# Patient Record
Sex: FEMALE | Race: BLACK OR AFRICAN AMERICAN | NOT HISPANIC OR LATINO | Employment: UNEMPLOYED | URBAN - METROPOLITAN AREA
[De-identification: names, ages, dates, MRNs, and addresses within clinical notes are randomized per-mention and may not be internally consistent; named-entity substitution may affect disease eponyms.]

---

## 2017-02-08 ENCOUNTER — HOSPITAL ENCOUNTER (EMERGENCY)
Facility: HOSPITAL | Age: 42
Discharge: HOME | End: 2017-02-08
Payer: MEDICAID

## 2017-02-08 VITALS
RESPIRATION RATE: 20 BRPM | TEMPERATURE: 98 F | BODY MASS INDEX: 45.99 KG/M2 | HEART RATE: 101 BPM | DIASTOLIC BLOOD PRESSURE: 71 MMHG | SYSTOLIC BLOOD PRESSURE: 158 MMHG | WEIGHT: 293 LBS | HEIGHT: 67 IN | OXYGEN SATURATION: 98 %

## 2017-02-08 DIAGNOSIS — R10.2 PELVIC PAIN DURING PREGNANCY: ICD-10-CM

## 2017-02-08 DIAGNOSIS — O26.899 PELVIC PAIN DURING PREGNANCY: ICD-10-CM

## 2017-02-08 DIAGNOSIS — Z34.90 PREGNANCY, UNSPECIFIED GESTATIONAL AGE: Primary | ICD-10-CM

## 2017-02-08 LAB
B-HCG UR QL: POSITIVE
BACTERIA #/AREA URNS HPF: NORMAL /HPF
BILIRUB UR QL STRIP: NEGATIVE
CLARITY UR: CLEAR
COLOR UR: YELLOW
GLUCOSE UR QL STRIP: NEGATIVE
HGB UR QL STRIP: ABNORMAL
HYALINE CASTS #/AREA URNS LPF: 0 /LPF
KETONES UR QL STRIP: NEGATIVE
LEUKOCYTE ESTERASE UR QL STRIP: NEGATIVE
MICROSCOPIC COMMENT: NORMAL
NITRITE UR QL STRIP: NEGATIVE
PH UR STRIP: 6 [PH] (ref 5–8)
PROT UR QL STRIP: ABNORMAL
RBC #/AREA URNS HPF: 0 /HPF (ref 0–4)
SP GR UR STRIP: 1.02 (ref 1–1.03)
SQUAMOUS #/AREA URNS HPF: 0 /HPF
URN SPEC COLLECT METH UR: ABNORMAL
UROBILINOGEN UR STRIP-ACNC: NEGATIVE EU/DL
WBC #/AREA URNS HPF: 0 /HPF (ref 0–5)

## 2017-02-08 PROCEDURE — 99283 EMERGENCY DEPT VISIT LOW MDM: CPT

## 2017-02-08 PROCEDURE — 81025 URINE PREGNANCY TEST: CPT

## 2017-02-08 PROCEDURE — 81000 URINALYSIS NONAUTO W/SCOPE: CPT

## 2017-02-08 NOTE — ED PROVIDER NOTES
SCRIBE #1 NOTE: I, Gallito Escalera, am scribing for, and in the presence of, Christopher Peña NP. I have scribed the entire note.      History      Chief Complaint   Patient presents with    pelvic pain     pt reports pelvic pain; frequent urination, odor with urine, nausea x 2 weeks        Review of patient's allergies indicates:  No Known Allergies     HPI   HPI    2/8/2017, 5:36 PM   History obtained from the patient      History of Present Illness: Natasha Seymour is a 41 y.o. female patient who presents to the Emergency Department for pelvic pain which onset gradually 2 weeks ago. Symptoms are constant and moderate in severity. Pt states the pain radiates to her back. Pt states she has had an odor coming from her urine. No mitigating or exacerbating factors reported. Associated sxs include increase in urine frequency, fatigue, and nausea. Patient denies any fever, chills, dysuria, hematuria, vomiting, diarrhea, blood in stool, vaginal pain, vaginal discharge, and all other sxs at this time. No further complaints or concerns at this time.         Arrival mode: Personal vehicle     PCP: No primary care provider on file.       Past Medical History:  Past Medical History   Diagnosis Date    Asthma     Bronchitis     Hypertension        Past Surgical History:  No past surgical history on file.      Family History:  No family history on file.    Social History:  Social History     Social History Main Topics    Smoking status: Never Smoker    Smokeless tobacco: Unknown    Alcohol use No    Drug use: No    Sexual activity: Yes     Partners: Female, Male     Birth control/ protection: None       ROS   Review of Systems   Constitutional: Positive for fatigue. Negative for chills and fever.   HENT: Negative for sore throat.    Respiratory: Negative for shortness of breath.    Cardiovascular: Negative for chest pain.   Gastrointestinal: Positive for nausea. Negative for blood in stool, diarrhea and vomiting.  "  Genitourinary: Positive for frequency and pelvic pain. Negative for dysuria, hematuria, vaginal discharge and vaginal pain.        (+) odor with urine   Musculoskeletal: Negative for back pain.   Skin: Negative for rash.   Neurological: Negative for weakness.   Hematological: Does not bruise/bleed easily.       Physical Exam    Initial Vitals   BP Pulse Resp Temp SpO2   02/08/17 1706 02/08/17 1706 02/08/17 1706 02/08/17 1706 02/08/17 1706   158/71 101 20 98.4 °F (36.9 °C) 98 %      Physical Exam  Nursing Notes and Vital Signs Reviewed.  Constitutional: Patient is in no acute distress. Awake and alert. Obese  Head: Atraumatic. Normocephalic.  Eyes: PERRL. EOM intact. Conjunctivae are not pale. No scleral icterus.  ENT: Mucous membranes are moist. Oropharynx is clear and symmetric.    Neck: Supple. Full ROM. No lymphadenopathy.  Cardiovascular: Regular rate. Regular rhythm. No murmurs, rubs, or gallops. Distal pulses are 2+ and symmetric.  Pulmonary/Chest: No respiratory distress. Clear to auscultation bilaterally. No wheezing, rales, or rhonchi.  Abdominal: Soft and non-distended.  There is no tenderness.  No rebound, guarding, or rigidity.  Good bowel sounds.    Genitourinary: No CVA tenderness  Musculoskeletal: Moves all extremities. No obvious deformities. No edema. No calf tenderness.  Skin: Warm and dry.  Neurological:  Alert, awake, and appropriate.  Normal speech.  No acute focal neurological deficits are appreciated.  Psychiatric: Normal affect. Good eye contact. Appropriate in content.    ED Course    Procedures  ED Vital Signs:  Vitals:    02/08/17 1706   BP: (!) 158/71   Pulse: 101   Resp: 20   Temp: 98.4 °F (36.9 °C)   TempSrc: Oral   SpO2: 98%   Weight: (!) 159.7 kg (352 lb)   Height: 5' 7" (1.702 m)       Abnormal Lab Results:  Labs Reviewed   URINALYSIS - Abnormal; Notable for the following:        Result Value    Protein, UA 2+ (*)     Occult Blood UA 3+ (*)     All other components within normal " limits   PREGNANCY TEST, URINE RAPID   URINALYSIS MICROSCOPIC        All Lab Results:  Results for orders placed or performed during the hospital encounter of 02/08/17   Urinalysis   Result Value Ref Range    Specimen UA Urine, Clean Catch     Color, UA Yellow Yellow, Straw, Melissa    Appearance, UA Clear Clear    pH, UA 6.0 5.0 - 8.0    Specific Gravity, UA 1.025 1.005 - 1.030    Protein, UA 2+ (A) Negative    Glucose, UA Negative Negative    Ketones, UA Negative Negative    Bilirubin (UA) Negative Negative    Occult Blood UA 3+ (A) Negative    Nitrite, UA Negative Negative    Urobilinogen, UA Negative <2.0 EU/dL    Leukocytes, UA Negative Negative   Rapid Pregnancy, Urine   Result Value Ref Range    Preg Test, Ur Positive    Urinalysis Microscopic   Result Value Ref Range    RBC, UA 0 0 - 4 /hpf    WBC, UA 0 0 - 5 /hpf    Bacteria, UA None None-Occ /hpf    Squam Epithel, UA 0 /hpf    Hyaline Casts, UA 0 0-1/lpf /lpf    Microscopic Comment SEE COMMENT                 The Emergency Provider reviewed the vital signs and test results, which are outlined above.    ED Discussion     6:22 PM: Discussed with pt all pertinent ED information and results. Discussed pt dx and plan of tx. Gave pt all f/u and return to the ED instructions. All questions and concerns were addressed at this time. Pt expresses understanding of information and instructions, and is comfortable with plan to discharge. Pt is stable for discharge.    Pre-hypertension/Hypertension: The pt has been informed that they may have pre-hypertension or hypertension based on a blood pressure reading in the ED. I recommend that the pt call the PCP listed on their discharge instructions or a physician of their choice this week to arrange f/u for further evaluation of possible pre-hypertension or hypertension.       ED Medication(s):  Medications - No data to display    Discharge Medication List as of 2/8/2017  6:22 PM      START taking these medications    Details    PNV cmb#21-iron-folic acid (PRENATAL COMPLETE)  mg-mcg Tab Take 1 tablet by mouth once daily., Starting 2/8/2017, Until Thu 2/8/18, Print             Follow-up Information     Schedule an appointment as soon as possible for a visit with [facility].    Specialty:  Obstetrics and Gynecology    Contact information:    [address] [place] [id number] [phone]    Additional information:    ([address]) 3rd floor            Medical Decision Making    Medical Decision Making:   Clinical Tests:   Lab Tests: Ordered and Reviewed           Scribe Attestation:   Scribe #1: I performed the above scribed service and the documentation accurately describes the services I performed. I attest to the accuracy of the note.    Attending:   Physician Attestation Statement for Scribe #1: I, Christopher Peña NP, personally performed the services described in this documentation, as scribed by Gallito Escalera, in my presence, and it is both accurate and complete.          Clinical Impression       ICD-10-CM ICD-9-CM   1. Pregnancy, unspecified gestational age Z33.1 V22.2   2. Pelvic pain during pregnancy O26.899 646.80    R10.2 625.9       Disposition:   Disposition: Discharged  Condition: Stable           Christopher Peña NP  02/09/17 020

## 2017-02-09 NOTE — DISCHARGE INSTRUCTIONS
Abdominal Pain and Early Pregnancy    (To rule out ectopic pregnancy or miscarriage)  Our tests show that you are pregnant, but the exact cause of your pain isnt clear.  Some pain and bleeding are common early in pregnancy. Often they stop, and you can go on to have a normal pregnancy and baby. Other times the pain or bleeding can be signs of a miscarriage or ectopic pregnancy. An ectopic pregnancy is a very serious problem. At this time it is unclear if your pregnancy will continue normally, if you will have a miscarriage, or if you could have an ectopic pregnancy. Below is some information about this.  Miscarriage  At this time we dont know whether you will have a miscarriage, or if things will clear up and your pregnancy will continue normally. We understand that this is emotionally difficult. There is little we can say to change the way you feel. But understand that miscarriages are common.  About 1 or 2 out of every 10 pregnancies end this way. Some end even before you know you are pregnant. This happens for a number of reasons, and usually we never figure out why. Its important you know that it is not your fault. It didnt happen because you did anything wrong.  Having sex or exercising does not cause a miscarriage. These activities are usually safe unless you have pain or bleeding or your doctor tells you to stop. Even minor falls wont cause a miscarriage. Miscarriages happen because things were not developing as they were supposed to. No medicine can prevent a miscarriage.  Ectopic pregnancy  In a normal pregnancy, the fertilized egg attaches to the wall of the womb (uterus). In an ectopic or tubal pregnancy, the fertilized egg attaches outside the uterus, usually in the fallopian tube. Very rarely, the egg attaches to an ovary or somewhere else in the abdomen. An ectopic pregnancy is much less common than a miscarriage, but it is very serious. The baby cannot survive, and as it grows it can rupture  the tube. This can cause internal bleeding and even death. Risk factors for an ectopic are:  · An ectopic pregnancy in the past  · Pelvic inflammatory disease, or PID  · Endometriosis  · Smoking  · An IUD  Additional tests  Because we dont know whats causing your symptoms, you will need more tests to figure out what the problem is. You may need:  Ultrasound  An ultrasound can usually find a normal pregnancy as early as 4 to 5 weeks along. If the ultrasound does not show the baby inside the uterus, it means that:  · You have a normal pregnancy less than 4 weeks along, or  · You are having or recently had a miscarriage, or  · You have an ectopic pregnancy  Quantitative HCG  This test measures the amount of a pregnancy hormone in your blood. Comparing today's test result to a repeat test in 2 days will show whether you have a normal pregnancy.  Laparoscopy  This is a type of surgery. The healthcare provider will put a tube with a light inside your belly (abdomen) to look directly at your pelvic organs. This test is used when it is not safe to wait 2 days for blood test results.  Important information  If you do have an ectopic pregnancy, there is a small chance that the growing fetus can tear the fallopian tube. This can cause severe internal bleeding. If this happens, you may have:  · Sudden severe pain in your lower abdomen  · Vaginal bleeding  · Weakness, dizziness, and sometimes fainting  If any of these symptoms occur:  · Call 911 or return immediately to the hospital.  · Do not drive yourself.  · Do not go to your healthcare provider's office or to a clinic. Go to the hospital.   Home care  Follow these guidelines to help care for yourself at home:  · Rest until your next exam. Dont do anything strenuous.  · Eat a light diet with foods that are easy to digest.  · Dont have sex until your healthcare provider says its OK.  Follow-up care  Follow up with your healthcare provider, or as advised. If you were told  to have a repeat blood test in 2 days, its important to get it done.  If you had an X-ray or ultrasound, a radiologist will review it. You will be told of any new findings that may affect your care.  Call 911  Call 911 if you have any of these:  · Severe pain and very heavy bleeding  · Severe lightheadedness, passing out, or fainting  · Rapid heart rate  · Trouble breathing  · Confused or difficulty waking up  When to seek medical advice  Call your healthcare provider right away if any of these occur:  · The pain in your abdomen gets worse, either suddenly or gradually.  · You are dizzy or weak when you stand.  · You have heavy vaginal bleeding. This means soaking 1 pad an hour for 3 hours.  · You have vaginal bleeding for more than 5 days.  · You have repeated vomiting or diarrhea.  · The pain in your abdomen moves to the lower right.  · You have blood in your vomit or bowel movements. This will be dark red or black.  · You have a fever of 100.4ºF (38ºC) or higher, or as directed by your healthcare provider  Date Last Reviewed: 10/1/2016  © 0989-5661 [employer]. [address] [place] [address]. All rights reserved. This information is not intended as a substitute for professional medical care. Always follow your healthcare professional's instructions.          What Is Prenatal Care?  Before becoming pregnant, you may have adopted good health habits to prepare for your baby. But if you didnt, start today. One of the first steps is learning how to take care of yourself. See your healthcare provider as soon as you think you may be pregnant. Then, continue prenatal care throughout your pregnancy.     Your healthcare provider will guide you and your partner through pregnancy.   Prenatal care helps you have a healthy baby  During prenatal care:  · Your healthcare provider evaluates the health of your pregnancy. Your healthcare provider will calculate a due date which gives an estimate of the  delivery of your baby. Many women give birth between 38 and 41 weeks of pregnancy. Your due date is determined by counting 40 weeks from the first day of your last menstrual period.  · The progress of your pregnancy is checked. This includes your babys growth, fetal heart rate, changes in your weight and blood pressure, and your overall health and comfort.  · Your healthcare provider may find new concerns and manage existing ones before problems happen.  · Your healthcare provider will check lab work through blood and urine.  You are part of a team  When youre pregnant, youre part of a team that includes you, your baby, and your healthcare provider. Your team also may include a partner or a main support person. He or she could be a loved one, like a spouse, a family member, or a friend. As you work toward giving your baby a healthy start, rely on your team members for support.  Its not too late to start good habits  What matters most is protecting your baby from this moment on. If you smoke, drink alcohol, or use drugs, now is the time to stop. If you need help, talk with your healthcare provider:  · Smoking increases the risk of losing your baby or having a low-birth-weight baby. If you smoke, quit now.  · Alcohol and drugs have been linked with miscarriage, birth defects, intellectual disability, and low birth weight. Avoid alcohol and drugs.  · Eat a healthy diet. This helps keep you and your baby strong and healthy. Follow your healthcare provider's instructions for nutrition. Also stay within the guidelines you are given for healthy weight gain.  · Take 400 micrograms to 800 micrograms (400 mcg to 800 mcg or 0.4 mg to 0.8 mg) of folic acid every day for at least 3 months before getting pregnant to lower your risk of some birth defects of the brain and spine. You can get folic acid from some foods. It is hard to get all of the folic acid you will need from foods alone. Talk with your healthcare provider  about taking a folic acid supplement.  · Regular exercise will help you stay fit and feel good during pregnancy. It can also help prevent or minimize back pain. Be sure to talk with your healthcare provider about how to exercise safely during pregnancy.  Date Last Reviewed: 8/16/2015  © 2636-2439 [employer]. [address] [place] [address], [place] [address]. All rights reserved. This information is not intended as a substitute for professional medical care. Always follow your healthcare professional's instructions.

## 2017-02-14 ENCOUNTER — HOSPITAL ENCOUNTER (EMERGENCY)
Facility: HOSPITAL | Age: 42
Discharge: HOME | End: 2017-02-14
Attending: EMERGENCY MEDICINE
Payer: MEDICAID

## 2017-02-14 VITALS
DIASTOLIC BLOOD PRESSURE: 97 MMHG | HEART RATE: 89 BPM | OXYGEN SATURATION: 98 % | RESPIRATION RATE: 19 BRPM | HEIGHT: 67 IN | BODY MASS INDEX: 45.99 KG/M2 | TEMPERATURE: 98 F | SYSTOLIC BLOOD PRESSURE: 167 MMHG | WEIGHT: 293 LBS

## 2017-02-14 DIAGNOSIS — Z3A.01 LESS THAN 8 WEEKS GESTATION OF PREGNANCY: ICD-10-CM

## 2017-02-14 DIAGNOSIS — T78.3XXA ANGIOEDEMA, INITIAL ENCOUNTER: Primary | ICD-10-CM

## 2017-02-14 PROCEDURE — 96372 THER/PROPH/DIAG INJ SC/IM: CPT

## 2017-02-14 PROCEDURE — 99283 EMERGENCY DEPT VISIT LOW MDM: CPT | Mod: 25

## 2017-02-14 PROCEDURE — 63600175 PHARM REV CODE 636 W HCPCS: Performed by: EMERGENCY MEDICINE

## 2017-02-14 RX ORDER — PREDNISONE 20 MG/1
40 TABLET ORAL DAILY
Qty: 10 TABLET | Refills: 0 | Status: SHIPPED | OUTPATIENT
Start: 2017-02-14 | End: 2017-02-24

## 2017-02-14 RX ORDER — METHYLPREDNISOLONE ACETATE 40 MG/ML
40 INJECTION, SUSPENSION INTRA-ARTICULAR; INTRALESIONAL; INTRAMUSCULAR; SOFT TISSUE
Status: COMPLETED | OUTPATIENT
Start: 2017-02-14 | End: 2017-02-14

## 2017-02-14 RX ORDER — HYDROCHLOROTHIAZIDE 25 MG/1
25 TABLET ORAL DAILY
COMMUNITY

## 2017-02-14 RX ORDER — DIPHENHYDRAMINE HCL 25 MG
25 CAPSULE ORAL EVERY 6 HOURS PRN
Qty: 30 CAPSULE | Refills: 1 | Status: SHIPPED | OUTPATIENT
Start: 2017-02-14

## 2017-02-14 RX ADMIN — METHYLPREDNISOLONE ACETATE 40 MG: 40 INJECTION, SUSPENSION INTRA-ARTICULAR; INTRALESIONAL; INTRAMUSCULAR; SOFT TISSUE at 10:02

## 2017-02-14 NOTE — ED AVS SNAPSHOT
[facility] -   [id number] [facility] [id number]               Natasha Seymour   2017  9:34 PM   ED    Description:  Female : 1975   Department:  [facility] -            Your Care was Coordinated By:     Provider Role From To    Ta Pereira MD Attending Provider 17 1380 --      Reason for Visit     Oral Swelling           Diagnoses this Visit        Comments    Angioedema, initial encounter    -  Primary       ED Disposition     ED Disposition Condition Comment    Discharge             To Do List           Follow-up Information     Follow up with your doctor In 2 day(s).        Follow up with your physician. Go in 3 day(s).       These Medications        Disp Refills Start End    diphenhydrAMINE (BENADRYL) 25 mg capsule 30 capsule 1 2017     Take 1 each (25 mg total) by mouth every 6 (six) hours as needed for Itching or Allergies. - Oral    predniSONE (DELTASONE) 20 MG tablet 10 tablet 0 2017    Take 2 tablets (40 mg total) by mouth once daily. - Oral      [facility]     [facility] -  Assistance  Registered nurses in the [facility] provide clinical advisement, health education, appointment booking, and other advisory services.  Call for this free service at [phone].             Medications           Message regarding Medications     Verify the changes and/or additions to your medication regime listed below are the same as discussed with your clinician today.  If any of these changes or additions are incorrect, please notify your healthcare provider.        START taking these NEW medications        Refills    diphenhydrAMINE (BENADRYL) 25 mg capsule 1    Sig: Take 1 each (25 mg total) by mouth every 6 (six) hours as needed for Itching or Allergies.    Class: Print    Route: Oral    predniSONE (DELTASONE) 20 MG tablet 0    Sig: Take 2 tablets (40 mg total)  "by mouth once daily.    Class: Print    Route: Oral      These medications were administered today        Dose Freq    methylPREDNISolone acetate injection 40 mg 40 mg ED 1 Time    Sig: Inject 1 mL (40 mg total) into the muscle ED 1 Time.    Class: Normal    Route: Intramuscular           Verify that the below list of medications is an accurate representation of the medications you are currently taking.  If none reported, the list may be blank. If incorrect, please contact your healthcare provider. Carry this list with you in case of emergency.           Current Medications     hydrochlorothiazide (HYDRODIURIL) 25 MG tablet Take 25 mg by mouth once daily.    diphenhydrAMINE (BENADRYL) 25 mg capsule Take 1 each (25 mg total) by mouth every 6 (six) hours as needed for Itching or Allergies.    PNV cmb#21-iron-folic acid (PRENATAL COMPLETE)  mg-mcg Tab Take 1 tablet by mouth once daily.    predniSONE (DELTASONE) 20 MG tablet Take 2 tablets (40 mg total) by mouth once daily.           Clinical Reference Information           Your Vitals Were     BP Pulse Temp Resp Height Weight    180/73 84 98.5 °F (36.9 °C) (Oral) 20 5' 7" (1.702 m) 157.9 kg (348 lb)    SpO2 BMI             98% 54.5 kg/m2         Allergies as of 2/14/2017     No Known Allergies      Immunizations Administered on Date of Encounter - 2/14/2017     None      ED Micro, Lab, POCT     None      ED Imaging Orders     None        Discharge Instructions           Angioedema  Angioedema (ZJ-ezq-gd-eh-ADRIEN-muh) is a sudden appearance of swollen patches (edema) on the skin or mucous membranes. It most often involves the face, lips, mouth, tongue, back of throat, or vocal cords. It may also occur in other places, such as the arms or legs. A rash may also appear during the first 4 days of this illness.  Symptoms  There are different types of angioedema. Your symptoms will depend on what type of angioedema you have. Swelling and redness may be the main symptoms. " Like allergic reactions, angioedema may include:  · Rash, hives, redness, welts, blisters  · Itching, burning, stinging, pain  · Dry, flaky, cracking, or scaly skin  · Swelling of the face, lips, or other parts of the body  More severe symptoms may include:  · Trouble swallowing, or feeling like your throat is closing  · Trouble breathing or wheezing  · Hoarse voice or trouble speaking  · Nausea, vomiting, diarrhea, or stomach cramps  · Feeling faint or lightheaded, rapid heart rate, or low blood pressure  Causes  Angioedema can be triggered by exposure to certain substances. Medical conditions involving the immune systems and certain infections may cause it. In rare cases, angioedema can be hereditary. Sometimes the cause may be very clear. However, it is often hard to find a cause. The most common causes include:  · Foods, such as shrimp, shellfish, peanuts, milk products, gluten, and eggs; also colorings, flavorings, and additives  · Insect bites or stings, from bees, mosquitos, fleas, or ticks  · Medicines, such as ACE inhibitors, penicillin, sulfa drugs, amoxicillin, aspirin, and ibuprofen  · Latex, which may be in gloves, clothes, toys, balloons, and some kinds of tape. People who are allergic to latex may have problems with foods such as bananas, avocados, kiwi, papaya, or chestnuts.  · Stress  · Heat, cold, or sunlight  The most common cause of angioedema is a reaction to a class of medicines called ACE inhibitors. These are used to treat high blood pressure. ACE inhibitors include captopril, enalapril, and lisinopril. Tell your doctor if you have angioedema symptoms and are taking any of these medicines.  Angioedema may recur. It is important to watch for the earliest signs of this condition (see the list below). Contact your healthcare provider right away if swelling involves the face, mouth, or throat.  Home care  Rest quietly today. Avoid vigorous physical activity.  Medicines: The healthcare provider  may prescribe medicines for itching, swelling, or pain. Follow the healthcare providers instructions when taking these medicines.  · Oral diphenhydramine is an antihistamine available without a prescription. Unless a prescription antihistamine was given, diphenhydramine may be used to reduce widespread itching. It may make you sleepy, so be careful using it when going to school, working, or driving. (Note: Do not use diphenhydramine if you have glaucoma or if you are a man who has trouble urinating due to an enlarged prostate.) Loratadine is an antihistamine that may cause less drowsiness.  · Do not use diphenhydramine cream on your skin. Some people can have an allergic reaction to this.  · Calamine lotion or oatmeal baths sometimes help with itching.  · You may use acetaminophen or ibuprofen for pain, unless another pain medicine was prescribed.  · If you were told that your angioedema was caused by a medicine you are taking, you must stop taking it. Ask your healthcare provider for a different one. In the future, advise medical staff that you are allergic to this medicine.  · If medicine was prescribed to treat angioedema (such as steroids or antihistamines), be sure you understand what the medicine is and how to take it. Then, take it as directed.  Follow-up care  Follow up with your healthcare provider, or as advised.  Call 911  Contact emergency services right away if any of these occur:  · Trouble breathing or swallowing, or wheezing  · Hoarse voice or trouble speaking  · Chest pain  · Confusion  · Extreme drowsiness or trouble awakening  · Fainting or loss of consciousness  · Rapid heart rate  · Vomiting blood, or large amounts of blood in stool  · Seizure  When to seek medical attention  Call your healthcare provider right away if any of the following occur:  · Increase in swelling of the lips, mouth, or tongue  · Severe abdominal pain  Date Last Reviewed: 7/20/2015  © 7705-1896 [employer]  [address] [place] [address]. All rights reserved. This information is not intended as a substitute for professional medical care. Always follow your healthcare professional's instructions.          Nasal Allergy Medicines  The table below lists the most common over-the-counter (OTC) medicines for nasal allergies. Some are pills. Some are liquids. And, some are nasal sprays. It's important to check with your healthcare provider or pharmacist before taking these medicines, even though they are available without a prescription. And, be sure to follow the instructions on the package labels.  Type of medicine Description of medicine   Antihistamines · Stops the release of histamine, a substance in the body that causes many allergy symptoms.  · Helps prevent sneezing, runny nose, and itchy and watery eyes.   Corticosteroids    · Reduces inflammation and swelling.  · Relieves itching and sneezing.   Decongestants · Reduce swelling of nasal passages and relieve sinus pressure  · Overuse can worsen symptoms.   Mast cell inhibitors · Also help prevent cells from releasing histamine  · Prevent and relieve sneezing, itchiness, and runny nose.   Anticholinergics · Decrease mucus production in the nasal passages.  · Relieves runny nose.   Saline sprays, rinses, and gels · Provide lubrication or moisture to nasal passages. These can be used as often as needed.  · Help soothe irritated nasal passages. Loosens thick mucus.   NOTE: Talk to your healthcare provider or pharmacist about the possible side effects and drug or food interactions of any medicine you take.   How to use nasal spray  Nasal sprays must be used the right way to be effective. Be sure to do the following:  · Blow your nose to clear your nostrils.  · Gently shake the bottle. Then remove the cap.  · With your right hand, carefully insert the tip of the bottle into your left nostril. Make sure to point the tip toward your ear and not the center of the  nose.  · While gently breathing in through your nose, press down once on the pump to release the spray.  · Breathe out through your mouth.  · With your left hand, repeat the steps for your right nostril.  Date Last Reviewed: 9/1/2016  © 8101-9897 [employer]. [address] [place] [address]. All rights reserved. This information is not intended as a substitute for professional medical care. Always follow your healthcare professional's instructions.          [facility] Sign-Up     Activating your [facility] account is as easy as 1-2-3!     1) Visit [email or web address], select Sign Up Now, enter this activation code and your date of birth, then select Next.  [email or web address]  Expires: 3/25/2017  6:22 PM      2) Create a username and password to use when you visit [facility] in the future and select a security question in case you lose your password and select Next.    3) Enter your e-mail address and click Sign Up!    Additional Information  If you have questions, please e-mail [email or web address] or call [phone] to talk to our [facility] staff. Remember, [facility] is NOT to be used for urgent needs. For medical emergencies, dial 911.          [facility] complies with applicable Federal civil rights laws and does not discriminate on the basis of race, color, national origin, age, disability, or sex.        Language Assistance Services     ATTENTION: Language assistance services are available, free of charge. Please call [phone].      ATENCIÓN: Si habla español, tiene a jesus disposición servicios gratuitos de asistencia lingüística. Llame al [phone].     CHÚ Ý: N?u b?n nói Ti?ng Vi?t, có các d?ch v? h? tr? ngôn ng? mi?n phí dành cho b?n. G?i s? [phone].

## 2017-02-15 NOTE — DISCHARGE INSTRUCTIONS
Angioedema  Angioedema (GE-yvv-as-eh-ADRIEN-muh) is a sudden appearance of swollen patches (edema) on the skin or mucous membranes. It most often involves the face, lips, mouth, tongue, back of throat, or vocal cords. It may also occur in other places, such as the arms or legs. A rash may also appear during the first 4 days of this illness.  Symptoms  There are different types of angioedema. Your symptoms will depend on what type of angioedema you have. Swelling and redness may be the main symptoms. Like allergic reactions, angioedema may include:  · Rash, hives, redness, welts, blisters  · Itching, burning, stinging, pain  · Dry, flaky, cracking, or scaly skin  · Swelling of the face, lips, or other parts of the body  More severe symptoms may include:  · Trouble swallowing, or feeling like your throat is closing  · Trouble breathing or wheezing  · Hoarse voice or trouble speaking  · Nausea, vomiting, diarrhea, or stomach cramps  · Feeling faint or lightheaded, rapid heart rate, or low blood pressure  Causes  Angioedema can be triggered by exposure to certain substances. Medical conditions involving the immune systems and certain infections may cause it. In rare cases, angioedema can be hereditary. Sometimes the cause may be very clear. However, it is often hard to find a cause. The most common causes include:  · Foods, such as shrimp, shellfish, peanuts, milk products, gluten, and eggs; also colorings, flavorings, and additives  · Insect bites or stings, from bees, mosquitos, fleas, or ticks  · Medicines, such as ACE inhibitors, penicillin, sulfa drugs, amoxicillin, aspirin, and ibuprofen  · Latex, which may be in gloves, clothes, toys, balloons, and some kinds of tape. People who are allergic to latex may have problems with foods such as bananas, avocados, kiwi, papaya, or chestnuts.  · Stress  · Heat, cold, or sunlight  The most common cause of angioedema is a reaction to a class of medicines called ACE  inhibitors. These are used to treat high blood pressure. ACE inhibitors include captopril, enalapril, and lisinopril. Tell your doctor if you have angioedema symptoms and are taking any of these medicines.  Angioedema may recur. It is important to watch for the earliest signs of this condition (see the list below). Contact your healthcare provider right away if swelling involves the face, mouth, or throat.  Home care  Rest quietly today. Avoid vigorous physical activity.  Medicines: The healthcare provider may prescribe medicines for itching, swelling, or pain. Follow the healthcare providers instructions when taking these medicines.  · Oral diphenhydramine is an antihistamine available without a prescription. Unless a prescription antihistamine was given, diphenhydramine may be used to reduce widespread itching. It may make you sleepy, so be careful using it when going to school, working, or driving. (Note: Do not use diphenhydramine if you have glaucoma or if you are a man who has trouble urinating due to an enlarged prostate.) Loratadine is an antihistamine that may cause less drowsiness.  · Do not use diphenhydramine cream on your skin. Some people can have an allergic reaction to this.  · Calamine lotion or oatmeal baths sometimes help with itching.  · You may use acetaminophen or ibuprofen for pain, unless another pain medicine was prescribed.  · If you were told that your angioedema was caused by a medicine you are taking, you must stop taking it. Ask your healthcare provider for a different one. In the future, advise medical staff that you are allergic to this medicine.  · If medicine was prescribed to treat angioedema (such as steroids or antihistamines), be sure you understand what the medicine is and how to take it. Then, take it as directed.  Follow-up care  Follow up with your healthcare provider, or as advised.  Call 911  Contact emergency services right away if any of these occur:  · Trouble  breathing or swallowing, or wheezing  · Hoarse voice or trouble speaking  · Chest pain  · Confusion  · Extreme drowsiness or trouble awakening  · Fainting or loss of consciousness  · Rapid heart rate  · Vomiting blood, or large amounts of blood in stool  · Seizure  When to seek medical attention  Call your healthcare provider right away if any of the following occur:  · Increase in swelling of the lips, mouth, or tongue  · Severe abdominal pain  Date Last Reviewed: 7/20/2015 © 2000-2016 [employer]. [address] [place] [address]. All rights reserved. This information is not intended as a substitute for professional medical care. Always follow your healthcare professional's instructions.          Nasal Allergy Medicines  The table below lists the most common over-the-counter (OTC) medicines for nasal allergies. Some are pills. Some are liquids. And, some are nasal sprays. It's important to check with your healthcare provider or pharmacist before taking these medicines, even though they are available without a prescription. And, be sure to follow the instructions on the package labels.  Type of medicine Description of medicine   Antihistamines · Stops the release of histamine, a substance in the body that causes many allergy symptoms.  · Helps prevent sneezing, runny nose, and itchy and watery eyes.   Corticosteroids    · Reduces inflammation and swelling.  · Relieves itching and sneezing.   Decongestants · Reduce swelling of nasal passages and relieve sinus pressure  · Overuse can worsen symptoms.   Mast cell inhibitors · Also help prevent cells from releasing histamine  · Prevent and relieve sneezing, itchiness, and runny nose.   Anticholinergics · Decrease mucus production in the nasal passages.  · Relieves runny nose.   Saline sprays, rinses, and gels · Provide lubrication or moisture to nasal passages. These can be used as often as needed.  · Help soothe irritated nasal passages.  Loosens thick mucus.   NOTE: Talk to your healthcare provider or pharmacist about the possible side effects and drug or food interactions of any medicine you take.   How to use nasal spray  Nasal sprays must be used the right way to be effective. Be sure to do the following:  · Blow your nose to clear your nostrils.  · Gently shake the bottle. Then remove the cap.  · With your right hand, carefully insert the tip of the bottle into your left nostril. Make sure to point the tip toward your ear and not the center of the nose.  · While gently breathing in through your nose, press down once on the pump to release the spray.  · Breathe out through your mouth.  · With your left hand, repeat the steps for your right nostril.  Date Last Reviewed: 9/1/2016 © 2000-2016 [employer]. [address] [place] [address], [place] [address]. All rights reserved. This information is not intended as a substitute for professional medical care. Always follow your healthcare professional's instructions.

## 2017-02-15 NOTE — ED NOTES
Pt resting in bed. rr /eu. Skin warm and dry. Airway patent. Upper lip swollen. Pt dies sob or difficulty breathing. Denies increasing swelling since arrival to ER. Pt denies further complaints or concerns at this time. dispo discussed per Dr. ePreira.

## 2017-02-15 NOTE — ED PROVIDER NOTES
SCRIBE #1 NOTE: I, Gallito Escalera, am scribing for, and in the presence of, Ta Pereira MD. I have scribed the entire note.      History      Chief Complaint   Patient presents with    Oral Swelling     pt reports lip and facial swelling since yesterday; denies SOB or difficulty breathing.        Review of patient's allergies indicates:  No Known Allergies     HPI   HPI    2/14/2017, 9:49 PM   History obtained from the patient      History of Present Illness: Natasha Seymour is a 41 y.o. female patient who presents to the Emergency Department for upper lip swelling which onset gradually 1 day ago. Symptoms are constant and moderate in severity. Pt states the swelling started on her lower lip then moved to her upper lip. Pt doesn't know what could've caused it. No mitigating or exacerbating factors reported. No associated sx reported. Patient denies any SOB, wheezing, trouble swallowing, sore throat, fever, chills, and all other sxs at this time. No further complaints or concerns at this time.         Arrival mode: Personal vehicle    PCP: Eder Willams Jr, MD       Past Medical History:  Past Medical History   Diagnosis Date    Asthma     Bronchitis     Hypertension        Past Surgical History:  No past surgical history on file.      Family History:  No family history on file.    Social History:  Social History     Social History Main Topics    Smoking status: Never Smoker    Smokeless tobacco: Unknown    Alcohol use No    Drug use: No    Sexual activity: Yes     Partners: Female, Male     Birth control/ protection: None       ROS   Review of Systems   Constitutional: Negative for chills and fever.   HENT: Positive for facial swelling (upper lip). Negative for sore throat and trouble swallowing.    Respiratory: Negative for shortness of breath and wheezing.    Cardiovascular: Negative for chest pain.   Gastrointestinal: Negative for nausea.   Genitourinary: Negative for dysuria.   Musculoskeletal:  "Negative for back pain.   Skin: Negative for rash.   Neurological: Negative for weakness.   Hematological: Does not bruise/bleed easily.       Physical Exam    Initial Vitals   BP Pulse Resp Temp SpO2   02/14/17 1932 02/14/17 1932 02/14/17 1932 02/14/17 1932 02/14/17 1932   149/83 96 18 98.5 °F (36.9 °C) 96 %      Physical Exam  Nursing Notes and Vital Signs Reviewed.  Constitutional: Patient is in no acute distress. Awake and alert. Well-developed and well-nourished.  Head: Atraumatic. Normocephalic.  Eyes: PERRL. EOM intact. Conjunctivae are not pale. No scleral icterus.  ENT: Mucous membranes are moist. Oropharynx is clear and symmetric.  Mouth/Throat: Angioedema of upper lip. Patient handles secretions normally.negative for dental caries. negative for gingival edema. No palpable fluctuance. No evidence of periodontal or periapical abscess. No trismus.   No tongue swelling.  Neck: Supple. Full ROM. No lymphadenopathy.  Cardiovascular: Regular rate. Regular rhythm. No murmurs, rubs, or gallops. Distal pulses are 2+ and symmetric.  Pulmonary/Chest: No respiratory distress. Clear to auscultation bilaterally. No wheezing, rales, or rhonchi.  Abdominal: Soft and non-distended.  There is no tenderness.  No rebound, guarding, or rigidity. Good bowel sounds.  Genitourinary: No CVA tenderness  Musculoskeletal: Moves all extremities. No obvious deformities. No edema. No calf tenderness.  Skin: Warm and dry.  Neurological:  Alert, awake, and appropriate.  Normal speech.  No acute focal neurological deficits are appreciated.  Psychiatric: Normal affect. Good eye contact. Appropriate in content.    ED Course    Procedures  ED Vital Signs:  Vitals:    02/14/17 1932 02/14/17 2132 02/14/17 2223   BP: (!) 149/83 (!) 180/73 (!) 167/97   Pulse: 96 84 89   Resp: 18 20 19   Temp: 98.5 °F (36.9 °C)  98.1 °F (36.7 °C)   TempSrc: Oral     SpO2: 96% 98% 98%   Weight: (!) 157.9 kg (348 lb)     Height: 5' 7" (1.702 m)                  The " Emergency Provider reviewed the vital signs and test results, which are outlined above.    ED Discussion     9:52 PM: Discussed pt dx and plan of tx. Gave pt all f/u and return to the ED instructions. All questions and concerns were addressed at this time. Pt expresses understanding of information and instructions, and is comfortable with plan to discharge. Pt is stable for discharge.    Patient is safe for discharge. There is no suggestion of airway or ENT emergency. Patient is hemodynamically stable and there is no suggestion of active anaphylaxis or progressive worsening of current symptoms.    Pre-hypertension/Hypertension: The pt has been informed that they may have pre-hypertension or hypertension based on a blood pressure reading in the ED. I recommend that the pt call the PCP listed on their discharge instructions or a physician of their choice this week to arrange f/u for further evaluation of possible pre-hypertension or hypertension.         ED Medication(s):  Medications   methylPREDNISolone acetate injection 40 mg (40 mg Intramuscular Given 2/14/17 2206)       Discharge Medication List as of 2/14/2017 10:14 PM      START taking these medications    Details   diphenhydrAMINE (BENADRYL) 25 mg capsule Take 1 each (25 mg total) by mouth every 6 (six) hours as needed for Itching or Allergies., Starting 2/14/2017, Until Discontinued, Print      predniSONE (DELTASONE) 20 MG tablet Take 2 tablets (40 mg total) by mouth once daily., Starting 2/14/2017, Until Fri 2/24/17, Print             Follow-up Information     Follow up with your doctor In 2 day(s).        Follow up with your physician. Go in 3 days.            Medical Decision Making              Scribe Attestation:   Scribe #1: I performed the above scribed service and the documentation accurately describes the services I performed. I attest to the accuracy of the note.    Attending:   Physician Attestation Statement for Scribe #1: I, Ta Pereira MD,  personally performed the services described in this documentation, as scribed by Gallito Escalera, in my presence, and it is both accurate and complete.          Clinical Impression       ICD-10-CM ICD-9-CM   1. Angioedema, initial encounter T78.3XXA 995.1   2. Less than 8 weeks gestation of pregnancy Z3A.01 V22.2       Disposition:   Disposition: Discharged  Condition: Stable         Ta Pereira MD  02/16/17 0536

## 2017-05-24 NOTE — ED AVS SNAPSHOT
[facility]  [address] [address]               Natasha Seymour   2017  5:27 PM   ED    Description:  Female : 1975   Department:  [facility]            Your Care was Coordinated By:     Provider Role From To    Christopher Peña NP Nurse Practitioner [phone] --      Reason for Visit     pelvic pain           Diagnoses this Visit        Comments    Pregnancy, unspecified gestational age    -  Primary     Pelvic pain during pregnancy           ED Disposition     None           To Do List           Follow-up Information     Schedule an appointment as soon as possible for a visit with [facility].    Specialty:  Obstetrics and Gynecology    Contact information:    [address] [facility] [address] [phone]    Additional information:    (off [facility] 3rd floor       These Medications        Disp Refills Start End    PNV cmb#21-iron-folic acid (PRENATAL COMPLETE)  mg-mcg Tab 30 tablet 0 2017    Take 1 tablet by mouth once daily. - Oral      [facility]     [facility] -  Assistance  Registered nurses in the [facility] provide clinical advisement, health education, appointment booking, and other advisory services.  Call for this free service at [phone].             Medications           Message regarding Medications     Verify the changes and/or additions to your medication regime listed below are the same as discussed with your clinician today.  If any of these changes or additions are incorrect, please notify your healthcare provider.        START taking these NEW medications        Refills    PNV cmb#21-iron-folic acid (PRENATAL COMPLETE)  mg-mcg Tab 0    Sig: Take 1 tablet by mouth once daily.    Class: Print    Route: Oral           Verify that the below list of medications is an accurate representation of  "the medications you are currently taking.  If none reported, the list may be blank. If incorrect, please contact your healthcare provider. Carry this list with you in case of emergency.           Current Medications     PNV cmb#21-iron-folic acid (PRENATAL COMPLETE)  mg-mcg Tab Take 1 tablet by mouth once daily.           Clinical Reference Information           Your Vitals Were     BP Pulse Temp Resp Height Weight    158/71 (BP Location: Right arm, Patient Position: Sitting) 101 98.4 °F (36.9 °C) (Oral) 20 5' 7" (1.702 m) 159.7 kg (352 lb)    SpO2 BMI             98% 55.13 kg/m2         Allergies as of 2/8/2017     No Known Allergies      Immunizations Administered on Date of Encounter - 2/8/2017     None      ED Micro, Lab, POCT     Start Ordered       Status Ordering Provider    02/08/17 1739 02/08/17 1738  Rapid Pregnancy, Urine  STAT      Final result     02/08/17 1708 02/08/17 1708  Urinalysis  STAT      Final result     02/08/17 1708 02/08/17 1708  Urinalysis Microscopic  Once      Final result       ED Imaging Orders     None        Discharge Instructions         Abdominal Pain and Early Pregnancy    (To rule out ectopic pregnancy or miscarriage)  Our tests show that you are pregnant, but the exact cause of your pain isnt clear.  Some pain and bleeding are common early in pregnancy. Often they stop, and you can go on to have a normal pregnancy and baby. Other times the pain or bleeding can be signs of a miscarriage or ectopic pregnancy. An ectopic pregnancy is a very serious problem. At this time it is unclear if your pregnancy will continue normally, if you will have a miscarriage, or if you could have an ectopic pregnancy. Below is some information about this.  Miscarriage  At this time we dont know whether you will have a miscarriage, or if things will clear up and your pregnancy will continue normally. We understand that this is emotionally difficult. There is little we can say to change the way " you feel. But understand that miscarriages are common.  About 1 or 2 out of every 10 pregnancies end this way. Some end even before you know you are pregnant. This happens for a number of reasons, and usually we never figure out why. Its important you know that it is not your fault. It didnt happen because you did anything wrong.  Having sex or exercising does not cause a miscarriage. These activities are usually safe unless you have pain or bleeding or your doctor tells you to stop. Even minor falls wont cause a miscarriage. Miscarriages happen because things were not developing as they were supposed to. No medicine can prevent a miscarriage.  Ectopic pregnancy  In a normal pregnancy, the fertilized egg attaches to the wall of the womb (uterus). In an ectopic or tubal pregnancy, the fertilized egg attaches outside the uterus, usually in the fallopian tube. Very rarely, the egg attaches to an ovary or somewhere else in the abdomen. An ectopic pregnancy is much less common than a miscarriage, but it is very serious. The baby cannot survive, and as it grows it can rupture the tube. This can cause internal bleeding and even death. Risk factors for an ectopic are:  · An ectopic pregnancy in the past  · Pelvic inflammatory disease, or PID  · Endometriosis  · Smoking  · An IUD  Additional tests  Because we dont know whats causing your symptoms, you will need more tests to figure out what the problem is. You may need:  Ultrasound  An ultrasound can usually find a normal pregnancy as early as 4 to 5 weeks along. If the ultrasound does not show the baby inside the uterus, it means that:  · You have a normal pregnancy less than 4 weeks along, or  · You are having or recently had a miscarriage, or  · You have an ectopic pregnancy  Quantitative HCG  This test measures the amount of a pregnancy hormone in your blood. Comparing today's test result to a repeat test in 2 days will show whether you have a normal  pregnancy.  Laparoscopy  This is a type of surgery. The healthcare provider will put a tube with a light inside your belly (abdomen) to look directly at your pelvic organs. This test is used when it is not safe to wait 2 days for blood test results.  Important information  If you do have an ectopic pregnancy, there is a small chance that the growing fetus can tear the fallopian tube. This can cause severe internal bleeding. If this happens, you may have:  · Sudden severe pain in your lower abdomen  · Vaginal bleeding  · Weakness, dizziness, and sometimes fainting  If any of these symptoms occur:  · Call 911 or return immediately to the hospital.  · Do not drive yourself.  · Do not go to your healthcare provider's office or to a clinic. Go to the hospital.   Home care  Follow these guidelines to help care for yourself at home:  · Rest until your next exam. Dont do anything strenuous.  · Eat a light diet with foods that are easy to digest.  · Dont have sex until your healthcare provider says its OK.  Follow-up care  Follow up with your healthcare provider, or as advised. If you were told to have a repeat blood test in 2 days, its important to get it done.  If you had an X-ray or ultrasound, a radiologist will review it. You will be told of any new findings that may affect your care.  Call 911  Call 911 if you have any of these:  · Severe pain and very heavy bleeding  · Severe lightheadedness, passing out, or fainting  · Rapid heart rate  · Trouble breathing  · Confused or difficulty waking up  When to seek medical advice  Call your healthcare provider right away if any of these occur:  · The pain in your abdomen gets worse, either suddenly or gradually.  · You are dizzy or weak when you stand.  · You have heavy vaginal bleeding. This means soaking 1 pad an hour for 3 hours.  · You have vaginal bleeding for more than 5 days.  · You have repeated vomiting or diarrhea.  · The pain in your abdomen moves to the lower  right.  · You have blood in your vomit or bowel movements. This will be dark red or black.  · You have a fever of 100.4ºF (38ºC) or higher, or as directed by your healthcare provider  Date Last Reviewed: 10/1/2016  © 7967-9937 [employer]. [address] [place] [address]. All rights reserved. This information is not intended as a substitute for professional medical care. Always follow your healthcare professional's instructions.          What Is Prenatal Care?  Before becoming pregnant, you may have adopted good health habits to prepare for your baby. But if you didnt, start today. One of the first steps is learning how to take care of yourself. See your healthcare provider as soon as you think you may be pregnant. Then, continue prenatal care throughout your pregnancy.     Your healthcare provider will guide you and your partner through pregnancy.   Prenatal care helps you have a healthy baby  During prenatal care:  · Your healthcare provider evaluates the health of your pregnancy. Your healthcare provider will calculate a due date which gives an estimate of the delivery of your baby. Many women give birth between 38 and 41 weeks of pregnancy. Your due date is determined by counting 40 weeks from the first day of your last menstrual period.  · The progress of your pregnancy is checked. This includes your babys growth, fetal heart rate, changes in your weight and blood pressure, and your overall health and comfort.  · Your healthcare provider may find new concerns and manage existing ones before problems happen.  · Your healthcare provider will check lab work through blood and urine.  You are part of a team  When youre pregnant, youre part of a team that includes you, your baby, and your healthcare provider. Your team also may include a partner or a main support person. He or she could be a loved one, like a spouse, a family member, or a friend. As you work toward giving your baby a  healthy start, rely on your team members for support.  Its not too late to start good habits  What matters most is protecting your baby from this moment on. If you smoke, drink alcohol, or use drugs, now is the time to stop. If you need help, talk with your healthcare provider:  · Smoking increases the risk of losing your baby or having a low-birth-weight baby. If you smoke, quit now.  · Alcohol and drugs have been linked with miscarriage, birth defects, intellectual disability, and low birth weight. Avoid alcohol and drugs.  · Eat a healthy diet. This helps keep you and your baby strong and healthy. Follow your healthcare provider's instructions for nutrition. Also stay within the guidelines you are given for healthy weight gain.  · Take 400 micrograms to 800 micrograms (400 mcg to 800 mcg or 0.4 mg to 0.8 mg) of folic acid every day for at least 3 months before getting pregnant to lower your risk of some birth defects of the brain and spine. You can get folic acid from some foods. It is hard to get all of the folic acid you will need from foods alone. Talk with your healthcare provider about taking a folic acid supplement.  · Regular exercise will help you stay fit and feel good during pregnancy. It can also help prevent or minimize back pain. Be sure to talk with your healthcare provider about how to exercise safely during pregnancy.  Date Last Reviewed: 8/16/2015  © 3076-3958 [employer]. [address] [place] [address]. All rights reserved. This information is not intended as a substitute for professional medical care. Always follow your healthcare professional's instructions.          [facility] Sign-Up     Activating your [facility] account is as easy as 1-2-3!     1) Visit [email or web address], select Sign Up Now, enter this activation code and your date of birth, then select Next.  [email or web address]  Expires: 3/25/2017  6:22 PM      2) Create a username and password to use when you visit  [facility] in the future and select a security question in case you lose your password and select Next.    3) Enter your e-mail address and click Sign Up!    Additional Information  If you have questions, please e-mail [email or web address] or call [phone] to talk to our [facility] staff. Remember, [facility] is NOT to be used for urgent needs. For medical emergencies, dial 911.          [facility] complies with applicable Federal civil rights laws and does not discriminate on the basis of race, color, national origin, age, disability, or sex.        Language Assistance Services     ATTENTION: Language assistance services are available, free of charge. Please call [phone].      ATENCIÓN: Si lourdesla tracy, tiene a jesus disposición servicios gratuitos de asistencia lingüística. Llame al [phone].     CHÚ Ý: N?u b?n nói Ti?ng Vi?t, có các d?ch v? h? tr? ngôn ng? mi?n phí dành cho b?n. G?i s? [phone].         (0) independent

## 2020-09-16 DIAGNOSIS — N18.30 CKD (CHRONIC KIDNEY DISEASE) STAGE 3, GFR 30-59 ML/MIN: Primary | ICD-10-CM

## 2020-09-16 RX ORDER — VALSARTAN AND HYDROCHLOROTHIAZIDE 80; 12.5 MG/1; MG/1
TABLET, FILM COATED ORAL
COMMUNITY
Start: 2020-07-10 | End: 2020-10-01 | Stop reason: ALTCHOICE

## 2020-09-16 RX ORDER — METOPROLOL SUCCINATE 200 MG/1
TABLET, EXTENDED RELEASE ORAL
COMMUNITY
Start: 2020-07-10

## 2020-09-16 RX ORDER — FERROUS SULFATE 325(65) MG
TABLET ORAL
COMMUNITY
Start: 2020-08-05

## 2020-09-16 RX ORDER — NIFEDIPINE 30 MG/1
TABLET, FILM COATED, EXTENDED RELEASE ORAL
COMMUNITY
Start: 2020-08-11

## 2020-09-16 RX ORDER — ASPIRIN 325 MG
50000 TABLET, DELAYED RELEASE (ENTERIC COATED) ORAL
COMMUNITY
Start: 2020-08-31 | End: 2020-10-01 | Stop reason: ALTCHOICE

## 2020-09-16 RX ORDER — HYDRALAZINE HYDROCHLORIDE 25 MG/1
TABLET, FILM COATED ORAL
COMMUNITY
Start: 2020-09-11 | End: 2020-10-01 | Stop reason: SDUPTHER

## 2020-09-28 ENCOUNTER — LAB VISIT (OUTPATIENT)
Dept: LAB | Facility: HOSPITAL | Age: 45
End: 2020-09-28
Attending: INTERNAL MEDICINE
Payer: MEDICAID

## 2020-09-28 DIAGNOSIS — N18.30 CKD (CHRONIC KIDNEY DISEASE) STAGE 3, GFR 30-59 ML/MIN: ICD-10-CM

## 2020-09-28 LAB
ALBUMIN SERPL BCP-MCNC: 2.7 G/DL (ref 3.5–5.2)
ANION GAP SERPL CALC-SCNC: 10 MMOL/L (ref 8–16)
BASOPHILS # BLD AUTO: 0.04 K/UL (ref 0–0.2)
BASOPHILS NFR BLD: 0.4 % (ref 0–1.9)
BUN SERPL-MCNC: 51 MG/DL (ref 6–20)
CALCIUM SERPL-MCNC: 8.4 MG/DL (ref 8.7–10.5)
CHLORIDE SERPL-SCNC: 113 MMOL/L (ref 95–110)
CO2 SERPL-SCNC: 17 MMOL/L (ref 23–29)
CREAT SERPL-MCNC: 5.3 MG/DL (ref 0.5–1.4)
DIFFERENTIAL METHOD: ABNORMAL
EOSINOPHIL # BLD AUTO: 0.2 K/UL (ref 0–0.5)
EOSINOPHIL NFR BLD: 2.1 % (ref 0–8)
ERYTHROCYTE [DISTWIDTH] IN BLOOD BY AUTOMATED COUNT: 16 % (ref 11.5–14.5)
EST. GFR  (AFRICAN AMERICAN): 10 ML/MIN/1.73 M^2
EST. GFR  (NON AFRICAN AMERICAN): 9 ML/MIN/1.73 M^2
GLUCOSE SERPL-MCNC: 104 MG/DL (ref 70–110)
HCT VFR BLD AUTO: 32.7 % (ref 37–48.5)
HGB BLD-MCNC: 9.2 G/DL (ref 12–16)
IMM GRANULOCYTES # BLD AUTO: 0.15 K/UL (ref 0–0.04)
IMM GRANULOCYTES NFR BLD AUTO: 1.5 % (ref 0–0.5)
LYMPHOCYTES # BLD AUTO: 2.7 K/UL (ref 1–4.8)
LYMPHOCYTES NFR BLD: 27.2 % (ref 18–48)
MCH RBC QN AUTO: 27.3 PG (ref 27–31)
MCHC RBC AUTO-ENTMCNC: 28.1 G/DL (ref 32–36)
MCV RBC AUTO: 97 FL (ref 82–98)
MONOCYTES # BLD AUTO: 0.5 K/UL (ref 0.3–1)
MONOCYTES NFR BLD: 5.1 % (ref 4–15)
NEUTROPHILS # BLD AUTO: 6.4 K/UL (ref 1.8–7.7)
NEUTROPHILS NFR BLD: 65.2 % (ref 38–73)
NRBC BLD-RTO: 0 /100 WBC
PHOSPHATE SERPL-MCNC: 5.3 MG/DL (ref 2.7–4.5)
PLATELET # BLD AUTO: 335 K/UL (ref 150–350)
PMV BLD AUTO: 10.4 FL (ref 9.2–12.9)
POTASSIUM SERPL-SCNC: 5.1 MMOL/L (ref 3.5–5.1)
RBC # BLD AUTO: 3.37 M/UL (ref 4–5.4)
SODIUM SERPL-SCNC: 140 MMOL/L (ref 136–145)
WBC # BLD AUTO: 9.86 K/UL (ref 3.9–12.7)

## 2020-09-28 PROCEDURE — 85025 COMPLETE CBC W/AUTO DIFF WBC: CPT

## 2020-09-28 PROCEDURE — 36415 COLL VENOUS BLD VENIPUNCTURE: CPT

## 2020-09-28 PROCEDURE — 80069 RENAL FUNCTION PANEL: CPT

## 2020-09-28 PROCEDURE — 83970 ASSAY OF PARATHORMONE: CPT

## 2020-09-29 LAB — PTH-INTACT SERPL-MCNC: 692 PG/ML (ref 9–77)

## 2020-10-01 ENCOUNTER — OFFICE VISIT (OUTPATIENT)
Dept: NEPHROLOGY | Facility: CLINIC | Age: 45
End: 2020-10-01
Payer: MEDICAID

## 2020-10-01 VITALS
SYSTOLIC BLOOD PRESSURE: 210 MMHG | HEART RATE: 88 BPM | WEIGHT: 293 LBS | BODY MASS INDEX: 45.99 KG/M2 | DIASTOLIC BLOOD PRESSURE: 110 MMHG | RESPIRATION RATE: 20 BRPM | HEIGHT: 67 IN

## 2020-10-01 DIAGNOSIS — I15.1 HYPERTENSION SECONDARY TO OTHER RENAL DISORDERS: ICD-10-CM

## 2020-10-01 DIAGNOSIS — N18.5 STAGE 5 CHRONIC KIDNEY DISEASE: Primary | ICD-10-CM

## 2020-10-01 DIAGNOSIS — N25.81 SECONDARY HYPERPARATHYROIDISM: ICD-10-CM

## 2020-10-01 DIAGNOSIS — N02.B9 IGA NEPHROPATHY: ICD-10-CM

## 2020-10-01 PROCEDURE — 99999 PR PBB SHADOW E&M-EST. PATIENT-LVL III: CPT | Mod: PBBFAC,,, | Performed by: INTERNAL MEDICINE

## 2020-10-01 PROCEDURE — 99205 OFFICE O/P NEW HI 60 MIN: CPT | Mod: S$PBB,,, | Performed by: INTERNAL MEDICINE

## 2020-10-01 PROCEDURE — 99213 OFFICE O/P EST LOW 20 MIN: CPT | Mod: PBBFAC | Performed by: INTERNAL MEDICINE

## 2020-10-01 PROCEDURE — 99205 PR OFFICE/OUTPT VISIT, NEW, LEVL V, 60-74 MIN: ICD-10-PCS | Mod: S$PBB,,, | Performed by: INTERNAL MEDICINE

## 2020-10-01 PROCEDURE — 99999 PR PBB SHADOW E&M-EST. PATIENT-LVL III: ICD-10-PCS | Mod: PBBFAC,,, | Performed by: INTERNAL MEDICINE

## 2020-10-01 RX ORDER — FUROSEMIDE 40 MG/1
40 TABLET ORAL 2 TIMES DAILY
Qty: 60 TABLET | Refills: 11 | Status: SHIPPED | OUTPATIENT
Start: 2020-10-01

## 2020-10-01 RX ORDER — ASPIRIN 325 MG
50000 TABLET, DELAYED RELEASE (ENTERIC COATED) ORAL
COMMUNITY
Start: 2020-07-21 | End: 2020-10-01 | Stop reason: ALTCHOICE

## 2020-10-01 RX ORDER — FUROSEMIDE 40 MG/1
40 TABLET ORAL DAILY
COMMUNITY
End: 2020-10-01 | Stop reason: SDUPTHER

## 2020-10-01 RX ORDER — ACETAMINOPHEN 325 MG/1
325 TABLET ORAL EVERY 6 HOURS PRN
COMMUNITY

## 2020-10-01 RX ORDER — CALCITRIOL 0.25 UG/1
0.25 CAPSULE ORAL DAILY
Qty: 30 CAPSULE | Refills: 11 | Status: SHIPPED | OUTPATIENT
Start: 2020-10-01

## 2020-10-01 RX ORDER — HYDRALAZINE HYDROCHLORIDE 50 MG/1
50 TABLET, FILM COATED ORAL EVERY 8 HOURS
Qty: 90 TABLET | Refills: 11 | Status: SHIPPED | OUTPATIENT
Start: 2020-10-01

## 2020-10-01 NOTE — PROGRESS NOTES
"Natasha Rossi is a 45 y.o. female for whom nephrology consult has been requested to evaluate and give opinion.   Renal clinic consultation note:  Date of clinic visit: 10/1/20  Reason for consult: second opinion for kidney failure  Self-referred  PCP: Dr. Nga Scott    HPI: Pt was seen and examined. H/o reviewed. Pt is a 44 y/o AA female who presented to seek second opinion about severe kidney failure. Pt was being seen previously by Dr. Joyner at [facility]. s Cr was 4.6 at his office, per pt. Pt reports that she was in her usual state of health until she was 15 years old (that is 30 years ago), when she was diagnosed with "high blood pressure." Since then she has struggled with difficult to control BP. About 3 months ago she was referred to Dr Joyner from her PCP for "abnormal kidney test." Pt had a kidney biopsy done on 20, which was reviewed by me today. The biopsy was read by [facility] group and showed IgA nephropathy with report of 80% interstitial fibrosis and severe tubular atrophy. Preparation for RRT was discussed with pt by Dr. Joyner, which of course pt disagreed with Pt. Pt came to see us for second opinion.     Above discussed with pt. Medical h/o was reviewed with her. Pt currently has no active c/o's, no SOB, no leg swelling, no palpitation.      PAST MEDICAL HISTORY:  IgA nephropathy (kidney biopsy proven, read by [facility], had 80% interstitial fibrosis and severe tubular atrophy), Hypertension since 15 years old, morbid obesity.    PAST SURGICAL HISTORY:  She  has a past surgical history that includes  section () and Renal biopsy ().    SOCIAL HISTORY:  She  reports that she has never smoked. She has never used smokeless tobacco. She reports previous alcohol use. She reports previous drug use.    FAMILY MEDICAL HISTORY:  No kidney disease, +HTN    Review of patient's allergies indicates:   Allergen Reactions    Penicillins Other (See Comments) " "          Prior to Admission medications    Medication Sig Start Date End Date Taking? Authorizing Provider   acetaminophen (TYLENOL) 325 MG tablet Take 325 mg by mouth every 6 (six) hours as needed for Pain.   Yes Historical Provider   ferrous sulfate (FEOSOL) 325 mg (65 mg iron) Tab tablet TAKE 1 TABLET PO QD WITH BREAKFAST 8/5/20  Yes Historical Provider   furosemide (LASIX) 40 MG tablet Take 1 tablet (40 mg total) by mouth 2 (two) times a day. 10/1/20  Yes Tan Lewis MD   hydrALAZINE (APRESOLINE) 50 MG tablet Take 1 tablet (50 mg total) by mouth every 8 (eight) hours. 10/1/20  Yes Tan Lewis MD   metoprolol succinate (TOPROL-XL) 200 MG 24 hr tablet TK 1 T PO QD 7/10/20  Yes Historical Provider   NIFEdipine (ADALAT CC) 30 MG TbSR TK 1 T PO  ONCE D 8/11/20  Yes Historical Provider   cholecalciferol, vitamin D3, 1,250 mcg (50,000 unit) capsule Take 50,000 Units by mouth every 7 days. 8/31/20 10/1/20 Yes Historical Provider   cholecalciferol, vitamin D3, 1,250 mcg (50,000 unit) capsule Take 50,000 Units by mouth. 7/21/20 10/1/20 Yes Historical Provider   furosemide (LASIX) 40 MG tablet Take 40 mg by mouth once daily.  10/1/20 Yes Historical Provider   hydrALAZINE (APRESOLINE) 25 MG tablet TK 1 T PO  TID 9/11/20 10/1/20 Yes Historical Provider   calcitRIOL (ROCALTROL) 0.25 MCG Cap Take 1 capsule (0.25 mcg total) by mouth once daily. 10/1/20   Tan Lewis MD   valsartan-hydrochlorothiazide (DIOVAN-HCT) 80-12.5 mg per tablet TK 1 T PO D 7/10/20 10/1/20  Historical Provider        REVIEW OF SYSTEMS:  Patient has no fever, fatigue, visual changes, chest pain, edema, cough, dyspnea, nausea, vomiting, constipation, diarrhea, arthralgias, pruritis, dizziness, weakness, depression, confusion.    PHYSICAL EXAM:   height is 5' 7" (1.702 m) and weight is 188 kg (414 lb 7.4 oz) (abnormal). Her blood pressure is 210/110 (abnormal) and her pulse is 88. Her respiration is 20.   Gen: WDWN female in no apparent " distress  Morbidly obese  Psych: Normal mood and affect  Skin: No rashes or ulcers  Eyes: Normal conjunctiva  ENT: Normal hearing  Neck: No JVD  Chest: Clear with no rales, rhonchi, wheezing with normal effort  CV: Regular with no murmurs, gallops or rubs  Abd: Soft, nontender, no distension  Ext: Trace pitting edema    Labs: reviewed  BMP  Lab Results   Component Value Date     09/28/2020    K 5.1 09/28/2020     (H) 09/28/2020    CO2 17 (L) 09/28/2020    BUN 51 (H) 09/28/2020    CREATININE 5.3 (H) 09/28/2020    CALCIUM 8.4 (L) 09/28/2020    ANIONGAP 10 09/28/2020    ESTGFRAFRICA 10 (A) 09/28/2020    EGFRNONAA 9 (A) 09/28/2020     Lab Results   Component Value Date    WBC 9.86 09/28/2020    HGB 9.2 (L) 09/28/2020    HCT 32.7 (L) 09/28/2020    MCV 97 09/28/2020     09/28/2020     Lab Results   Component Value Date    .0 (H) 09/28/2020    CALCIUM 8.4 (L) 09/28/2020    PHOS 5.3 (H) 09/28/2020     Kidney biopsy report from [facility] ([phone]) reviewed. Biopsy was done on 9/9/20  [facility]:  IgA nephropathy with severe interstitial fibrosis 80%, and severe tubular atrophy.      IMPRESSION AND RECOMMENDATIONS:  46 y/o female with kidney biopsy proven IgA nephropathy presents for second opinion regarding advanced and severe renal failure:    1. Renal: Pt had severe and advanced CKD, c/w stage 5.  Renal failure caused by IgA nephropathy, complicated by severely uncontrolled BP  Agree with Dr. Joyner that pt will benefit from dialysis  Noted IgA nephropathy not common in AA population    Pt has complication related to kidney failure and c/w chronicity of her renal disease:  Secondary hyperparathyroidism: moderately severe. Will d/c cholecalciferol. Will start calcitriol 0.25 ug po qd  Hypocalcemia  Hyperphosphatemia   Anemia: normocytic. Will refer to hem/onc  Hyperkalemia: mild. Will d/c ashley    Pt agreed to discuss dialysis:  HD vs PD discussed with pt.  Pt  at this point undecided.  Extensive time spent to describe both PD and HD  Severe obesity will be an impediment for kidney transplant at present    2. HTN: BP uncontrolled.  Meds reviewed  Will increase hydralazine from 25 to 50 mg po tid  Will re-start lasix 40 mg po bid (explained to pt that lasix does not cause renal failure, as long as it has not caused severe dehydration)  Will need diuresis and ultimately dialysis for adequate BP control    Plans and recommendations:  As discussed above  Opportunity for questions provided  Encouraged pt to return to Dr. Joyner or pt may choose.  Total time spent 60 minutes including time needed to review the records, the   patient evaluation, documentation, face-to-face discussion with the patient,   more than 50% of the time was spent on coordination of care and counseling.    Level V visit.    Tan Lewis MD

## 2020-12-11 ENCOUNTER — LAB VISIT (OUTPATIENT)
Dept: LAB | Facility: HOSPITAL | Age: 45
End: 2020-12-11
Attending: INTERNAL MEDICINE
Payer: MEDICAID

## 2020-12-11 DIAGNOSIS — N18.5 STAGE 5 CHRONIC KIDNEY DISEASE: ICD-10-CM

## 2020-12-11 LAB
ALBUMIN SERPL BCP-MCNC: 2.6 G/DL (ref 3.5–5.2)
ANION GAP SERPL CALC-SCNC: 11 MMOL/L (ref 8–16)
BUN SERPL-MCNC: 44 MG/DL (ref 6–20)
CALCIUM SERPL-MCNC: 7.8 MG/DL (ref 8.7–10.5)
CHLORIDE SERPL-SCNC: 110 MMOL/L (ref 95–110)
CO2 SERPL-SCNC: 17 MMOL/L (ref 23–29)
CREAT SERPL-MCNC: 5.6 MG/DL (ref 0.5–1.4)
EST. GFR  (AFRICAN AMERICAN): 9.7 ML/MIN/1.73 M^2
EST. GFR  (NON AFRICAN AMERICAN): 8.4 ML/MIN/1.73 M^2
GLUCOSE SERPL-MCNC: 113 MG/DL (ref 70–110)
PHOSPHATE SERPL-MCNC: 4.5 MG/DL (ref 2.7–4.5)
POTASSIUM SERPL-SCNC: 4.5 MMOL/L (ref 3.5–5.1)
PTH-INTACT SERPL-MCNC: 798 PG/ML (ref 9–77)
SODIUM SERPL-SCNC: 138 MMOL/L (ref 136–145)

## 2020-12-11 PROCEDURE — 85025 COMPLETE CBC W/AUTO DIFF WBC: CPT

## 2020-12-11 PROCEDURE — 80069 RENAL FUNCTION PANEL: CPT

## 2020-12-11 PROCEDURE — 83970 ASSAY OF PARATHORMONE: CPT

## 2020-12-11 PROCEDURE — 36415 COLL VENOUS BLD VENIPUNCTURE: CPT

## 2020-12-12 LAB
BASOPHILS # BLD AUTO: 0.03 K/UL (ref 0–0.2)
BASOPHILS NFR BLD: 0.3 % (ref 0–1.9)
DIFFERENTIAL METHOD: ABNORMAL
EOSINOPHIL # BLD AUTO: 0.2 K/UL (ref 0–0.5)
EOSINOPHIL NFR BLD: 2.2 % (ref 0–8)
ERYTHROCYTE [DISTWIDTH] IN BLOOD BY AUTOMATED COUNT: 16.4 % (ref 11.5–14.5)
HCT VFR BLD AUTO: 30.6 % (ref 37–48.5)
HGB BLD-MCNC: 8.2 G/DL (ref 12–16)
IMM GRANULOCYTES # BLD AUTO: 0.02 K/UL (ref 0–0.04)
IMM GRANULOCYTES NFR BLD AUTO: 0.2 % (ref 0–0.5)
LYMPHOCYTES # BLD AUTO: 2.2 K/UL (ref 1–4.8)
LYMPHOCYTES NFR BLD: 21.1 % (ref 18–48)
MCH RBC QN AUTO: 27.3 PG (ref 27–31)
MCHC RBC AUTO-ENTMCNC: 26.8 G/DL (ref 32–36)
MCV RBC AUTO: 102 FL (ref 82–98)
MONOCYTES # BLD AUTO: 0.6 K/UL (ref 0.3–1)
MONOCYTES NFR BLD: 5.6 % (ref 4–15)
NEUTROPHILS # BLD AUTO: 7.4 K/UL (ref 1.8–7.7)
NEUTROPHILS NFR BLD: 70.6 % (ref 38–73)
NRBC BLD-RTO: 0 /100 WBC
PLATELET # BLD AUTO: 294 K/UL (ref 150–350)
PMV BLD AUTO: 11.4 FL (ref 9.2–12.9)
RBC # BLD AUTO: 3 M/UL (ref 4–5.4)
WBC # BLD AUTO: 10.46 K/UL (ref 3.9–12.7)